# Patient Record
Sex: FEMALE | URBAN - METROPOLITAN AREA
[De-identification: names, ages, dates, MRNs, and addresses within clinical notes are randomized per-mention and may not be internally consistent; named-entity substitution may affect disease eponyms.]

---

## 2022-11-10 ENCOUNTER — OFFICE VISIT (OUTPATIENT)
Dept: URBAN - METROPOLITAN AREA CLINIC 118 | Facility: CLINIC | Age: 32
End: 2022-11-10

## 2025-07-02 ENCOUNTER — OFFICE VISIT (OUTPATIENT)
Dept: URBAN - METROPOLITAN AREA CLINIC 118 | Facility: CLINIC | Age: 35
End: 2025-07-02
Payer: COMMERCIAL

## 2025-07-02 ENCOUNTER — DASHBOARD ENCOUNTERS (OUTPATIENT)
Age: 35
End: 2025-07-02

## 2025-07-02 DIAGNOSIS — A04.8 H. PYLORI INFECTION: ICD-10-CM

## 2025-07-02 DIAGNOSIS — R68.81 EARLY SATIETY: ICD-10-CM

## 2025-07-02 DIAGNOSIS — R10.13 DYSPEPSIA: ICD-10-CM

## 2025-07-02 PROCEDURE — 99204 OFFICE O/P NEW MOD 45 MIN: CPT | Performed by: INTERNAL MEDICINE

## 2025-07-02 RX ORDER — ONDANSETRON 4 MG/1
1 TABLET ON THE TONGUE AND ALLOW TO DISSOLVE TABLET, ORALLY DISINTEGRATING ORAL ONCE A DAY
Qty: 30 | OUTPATIENT
Start: 2025-07-02

## 2025-07-02 RX ORDER — OMEPRAZOLE 20 MG/1
CAPSULE, DELAYED RELEASE ORAL
Qty: 28 CAPSULE | Status: ACTIVE | COMMUNITY

## 2025-07-02 RX ORDER — TETRACYCLINE HYDROCHLORIDE 500 MG/1
CAPSULE ORAL
Qty: 56 CAPSULE | Status: ACTIVE | COMMUNITY

## 2025-07-02 RX ORDER — METRONIDAZOLE 500 MG/1
TABLET ORAL
Qty: 56 TABLET | Status: ACTIVE | COMMUNITY

## 2025-07-02 NOTE — HPI-TODAY'S VISIT:
Ms. Glover is a 34-year-old female with no significant past medical history who presents today for evaluation of H. pylori.  She was referred by her PCP and a copy of this documentation will be sent to the referring provider. Patient underwent H. pylori breath test with her PCP due to upper abdominal cramping, bloating and early satiety.  The results are in the chart and were positive.  She was initially started on quadruple therapy, but states she only took it for a few days and then stopped.  Her PCP then restarted her on quadruple therapy after educating her on the importance of finishing the antibiotics.  It appears that today she is taking the metronidazole, tetracycline and omeprazole as prescribed but she is not taking the bismuth.  Has been on the antibiotics for about a week.  She is having a lot of nausea with the antibiotics.  States her other symptoms have improved though.  Her weight is overall stable.  She denies changes in her bowel movements or GI bleeding.  No prior EGD/colonoscopy.  Of note, she also reports an abnormal test of her liver but is unsure what exactly it was. She had labs and ultrasound done with her PCP.

## 2025-07-16 ENCOUNTER — OFFICE VISIT (OUTPATIENT)
Dept: URBAN - METROPOLITAN AREA CLINIC 118 | Facility: CLINIC | Age: 35
End: 2025-07-16

## 2025-08-15 ENCOUNTER — OFFICE VISIT (OUTPATIENT)
Dept: URBAN - METROPOLITAN AREA CLINIC 118 | Facility: CLINIC | Age: 35
End: 2025-08-15